# Patient Record
Sex: FEMALE
[De-identification: names, ages, dates, MRNs, and addresses within clinical notes are randomized per-mention and may not be internally consistent; named-entity substitution may affect disease eponyms.]

---

## 2017-01-11 ENCOUNTER — APPOINTMENT (OUTPATIENT)
Dept: OBGYN | Facility: CLINIC | Age: 47
End: 2017-01-11

## 2017-01-11 VITALS
DIASTOLIC BLOOD PRESSURE: 74 MMHG | WEIGHT: 144 LBS | BODY MASS INDEX: 22.6 KG/M2 | HEIGHT: 67 IN | SYSTOLIC BLOOD PRESSURE: 116 MMHG

## 2017-01-11 DIAGNOSIS — Z78.9 OTHER SPECIFIED HEALTH STATUS: ICD-10-CM

## 2017-01-11 DIAGNOSIS — B36.9 SUPERFICIAL MYCOSIS, UNSPECIFIED: ICD-10-CM

## 2017-01-11 DIAGNOSIS — R31.29 OTHER MICROSCOPIC HEMATURIA: ICD-10-CM

## 2017-01-11 DIAGNOSIS — L80 VITILIGO: ICD-10-CM

## 2017-01-11 DIAGNOSIS — Z83.3 FAMILY HISTORY OF DIABETES MELLITUS: ICD-10-CM

## 2017-01-11 DIAGNOSIS — F15.90 OTHER STIMULANT USE, UNSPECIFIED, UNCOMPLICATED: ICD-10-CM

## 2017-01-11 DIAGNOSIS — N60.19 DIFFUSE CYSTIC MASTOPATHY OF UNSPECIFIED BREAST: ICD-10-CM

## 2017-01-11 DIAGNOSIS — Z82.49 FAMILY HISTORY OF ISCHEMIC HEART DISEASE AND OTHER DISEASES OF THE CIRCULATORY SYSTEM: ICD-10-CM

## 2017-01-11 DIAGNOSIS — Z87.891 PERSONAL HISTORY OF NICOTINE DEPENDENCE: ICD-10-CM

## 2017-01-11 LAB
BILIRUB UR QL STRIP: NORMAL
COLLECTION METHOD: NORMAL
GLUCOSE UR-MCNC: NORMAL
HCG UR QL: 0.2 EU/DL
HEMOCCULT SP1 STL QL: NEGATIVE
HGB UR QL STRIP.AUTO: ABNORMAL
KETONES UR-MCNC: NORMAL
LEUKOCYTE ESTERASE UR QL STRIP: NORMAL
NITRITE UR QL STRIP: NORMAL
PH UR STRIP: 6
PROT UR STRIP-MCNC: NORMAL
SP GR UR STRIP: 1.01

## 2017-01-11 RX ORDER — UBIDECARENONE/VIT E ACET 100MG-5
CAPSULE ORAL
Refills: 0 | Status: ACTIVE | COMMUNITY

## 2017-01-11 RX ORDER — MULTIVITAMIN
TABLET ORAL
Refills: 0 | Status: ACTIVE | COMMUNITY

## 2017-01-11 RX ORDER — CALCIUM CITRATE/VITAMIN D3 315MG-6.25
TABLET ORAL
Refills: 0 | Status: ACTIVE | COMMUNITY

## 2017-01-12 ENCOUNTER — TRANSCRIPTION ENCOUNTER (OUTPATIENT)
Age: 47
End: 2017-01-12

## 2017-01-16 LAB — BACTERIA UR CULT: NORMAL

## 2017-01-18 LAB
CYTOLOGY CVX/VAG DOC THIN PREP: NORMAL
HPV HIGH+LOW RISK DNA PNL CVX: NEGATIVE

## 2017-05-30 ENCOUNTER — APPOINTMENT (OUTPATIENT)
Dept: INTERNAL MEDICINE | Facility: CLINIC | Age: 47
End: 2017-05-30

## 2017-10-24 ENCOUNTER — APPOINTMENT (OUTPATIENT)
Dept: INTERNAL MEDICINE | Facility: CLINIC | Age: 47
End: 2017-10-24
Payer: OTHER GOVERNMENT

## 2017-10-24 PROCEDURE — G0008: CPT

## 2017-10-24 PROCEDURE — 90686 IIV4 VACC NO PRSV 0.5 ML IM: CPT

## 2017-10-24 PROCEDURE — 99213 OFFICE O/P EST LOW 20 MIN: CPT | Mod: 25

## 2017-10-24 PROCEDURE — 36415 COLL VENOUS BLD VENIPUNCTURE: CPT

## 2018-05-29 ENCOUNTER — APPOINTMENT (OUTPATIENT)
Dept: OBGYN | Facility: CLINIC | Age: 48
End: 2018-05-29
Payer: OTHER GOVERNMENT

## 2018-05-29 VITALS
HEIGHT: 67 IN | WEIGHT: 149.01 LBS | DIASTOLIC BLOOD PRESSURE: 78 MMHG | BODY MASS INDEX: 23.39 KG/M2 | SYSTOLIC BLOOD PRESSURE: 120 MMHG

## 2018-05-29 DIAGNOSIS — N92.6 IRREGULAR MENSTRUATION, UNSPECIFIED: ICD-10-CM

## 2018-05-29 LAB
BILIRUB UR QL STRIP: NORMAL
COLLECTION METHOD: NORMAL
GLUCOSE UR-MCNC: NORMAL
HCG UR QL: 0.2 EU/DL
HGB UR QL STRIP.AUTO: ABNORMAL
KETONES UR-MCNC: NORMAL
LEUKOCYTE ESTERASE UR QL STRIP: NORMAL
NITRITE UR QL STRIP: NORMAL
PH UR STRIP: 6.5
PROT UR STRIP-MCNC: NORMAL
SP GR UR STRIP: 1.01

## 2018-05-29 PROCEDURE — 99396 PREV VISIT EST AGE 40-64: CPT

## 2018-05-29 PROCEDURE — 81003 URINALYSIS AUTO W/O SCOPE: CPT | Mod: QW

## 2018-10-25 ENCOUNTER — LABORATORY RESULT (OUTPATIENT)
Age: 48
End: 2018-10-25

## 2018-10-25 ENCOUNTER — MEDICATION RENEWAL (OUTPATIENT)
Age: 48
End: 2018-10-25

## 2018-10-25 ENCOUNTER — APPOINTMENT (OUTPATIENT)
Dept: INTERNAL MEDICINE | Facility: CLINIC | Age: 48
End: 2018-10-25
Payer: OTHER GOVERNMENT

## 2018-10-25 VITALS
HEIGHT: 67 IN | SYSTOLIC BLOOD PRESSURE: 120 MMHG | WEIGHT: 149 LBS | BODY MASS INDEX: 23.39 KG/M2 | DIASTOLIC BLOOD PRESSURE: 65 MMHG

## 2018-10-25 PROCEDURE — G0008: CPT

## 2018-10-25 PROCEDURE — 99214 OFFICE O/P EST MOD 30 MIN: CPT | Mod: 25

## 2018-10-25 PROCEDURE — 90686 IIV4 VACC NO PRSV 0.5 ML IM: CPT

## 2018-10-25 PROCEDURE — 36415 COLL VENOUS BLD VENIPUNCTURE: CPT

## 2018-10-25 RX ORDER — NYSTATIN AND TRIAMCINOLONE ACETONIDE 100000; 1 MG/G; MG/G
100000-0.1 CREAM TOPICAL TWICE DAILY
Qty: 1 | Refills: 3 | Status: DISCONTINUED | COMMUNITY
Start: 2017-01-11 | End: 2018-10-25

## 2018-10-25 RX ORDER — LEVOTHYROXINE SODIUM 137 UG/1
TABLET ORAL
Refills: 0 | Status: DISCONTINUED | COMMUNITY
End: 2018-10-25

## 2018-10-25 NOTE — ASSESSMENT
[FreeTextEntry1] : Physical examination is entirely within normal limits. Review of lab data from 6 months ago normal thyroid profile. Patient had elevated glucose one year ago and a repeat glucose and A1c will be reevaluated. In addition she received Fluzone vaccination after consent.\par \par Routine thyroid panel was obtained today and patient will followup in 6 months.\par \par All issues regarding patient's health and medical problems have been discussed. The patient understands and concurs with the treatment plan.

## 2018-10-25 NOTE — HISTORY OF PRESENT ILLNESS
[FreeTextEntry1] : check up and flu shot [de-identified] : Patient is here for routine followup on Hashimoto's thyroiditis and would like to get a flu vaccine. She is up-to-date with her screening, has seen a gynecologist for routine evaluation and is compliant with her medications. She has no new issues

## 2018-10-26 LAB
ALBUMIN SERPL ELPH-MCNC: 4.4 G/DL
ALP BLD-CCNC: 81 U/L
ALT SERPL-CCNC: 12 U/L
ANION GAP SERPL CALC-SCNC: 15 MMOL/L
AST SERPL-CCNC: 15 U/L
BILIRUB SERPL-MCNC: 0.2 MG/DL
BUN SERPL-MCNC: 13 MG/DL
CALCIUM SERPL-MCNC: 9.4 MG/DL
CHLORIDE SERPL-SCNC: 101 MMOL/L
CO2 SERPL-SCNC: 25 MMOL/L
CREAT SERPL-MCNC: 0.78 MG/DL
GLUCOSE SERPL-MCNC: 205 MG/DL
HBA1C MFR BLD HPLC: 5.1 %
POTASSIUM SERPL-SCNC: 4.3 MMOL/L
PROT SERPL-MCNC: 7 G/DL
SODIUM SERPL-SCNC: 141 MMOL/L
T4 SERPL-MCNC: 9.2 UG/DL
TSH SERPL-ACNC: 0.26 UIU/ML

## 2018-10-27 ENCOUNTER — MOBILE ON CALL (OUTPATIENT)
Age: 48
End: 2018-10-27

## 2018-10-29 LAB
ANION GAP SERPL CALC-SCNC: 18 MMOL/L
BUN SERPL-MCNC: 14 MG/DL
CALCIUM SERPL-MCNC: 9.5 MG/DL
CHLORIDE SERPL-SCNC: 101 MMOL/L
CO2 SERPL-SCNC: 22 MMOL/L
CREAT SERPL-MCNC: 0.77 MG/DL
FRUCTOSAMINE SERPL-MCNC: 246 UMOL/L
GLUCOSE SERPL-MCNC: 204 MG/DL
POTASSIUM SERPL-SCNC: 4.3 MMOL/L
SODIUM SERPL-SCNC: 141 MMOL/L

## 2018-10-30 ENCOUNTER — TRANSCRIPTION ENCOUNTER (OUTPATIENT)
Age: 48
End: 2018-10-30

## 2018-10-30 LAB
HGB A MFR BLD: 95.9 %
HGB A2 MFR BLD: 2.7 %
HGB F MFR BLD: 1.4 %
HGB FRACT BLD-IMP: NORMAL

## 2018-11-01 ENCOUNTER — MEDICATION RENEWAL (OUTPATIENT)
Age: 48
End: 2018-11-01

## 2018-11-06 ENCOUNTER — TRANSCRIPTION ENCOUNTER (OUTPATIENT)
Age: 48
End: 2018-11-06

## 2019-04-02 ENCOUNTER — APPOINTMENT (OUTPATIENT)
Age: 49
End: 2019-04-02
Payer: OTHER GOVERNMENT

## 2019-04-02 ENCOUNTER — TRANSCRIPTION ENCOUNTER (OUTPATIENT)
Age: 49
End: 2019-04-02

## 2019-04-02 VITALS
SYSTOLIC BLOOD PRESSURE: 101 MMHG | HEART RATE: 97 BPM | WEIGHT: 150 LBS | HEIGHT: 67 IN | DIASTOLIC BLOOD PRESSURE: 71 MMHG | BODY MASS INDEX: 23.54 KG/M2

## 2019-04-02 PROCEDURE — 99214 OFFICE O/P EST MOD 30 MIN: CPT

## 2019-04-02 NOTE — HISTORY OF PRESENT ILLNESS
[FreeTextEntry1] : elevated glucose and thyroid eval [de-identified] : Patient is here for routine followup on Hashimoto's thyroiditis She is up-to-date with her screening, has seen a gynecologist for routine evaluation and is compliant with her medications. She has no new issues\par Last visit her random glucose 2 204 with a normal hemoglobin A1c, normal hemoglobin electrophoresis and normal fructosamine level. She was not a gestational diabetic and has an appointment with endocrinology in 9 months

## 2019-04-02 NOTE — PLAN
[FreeTextEntry1] : Results of current evaluation discussed with patient. Medications were reviewed and all relevant labs as well as a 1C level and glucose levels were discussed in depth with  patient. Further options for ideal control were discussed, long-term complications of diabetes and screening for complications were also discussed. Patient was conversant and all relevant questions were asked and answered. It is unclear how to explain the elevated load glucose was normal A1c and fructosamine. She has a normal hemoglobin electrophoresis and no evidence of hemolysis. I have referred her to endocrinology and will also do a glucose tolerance test to better define the issue. This was all discussed with patient at great depth\par \par Physical examination is within normal limits and routine thyroid labs were sent.\par \par All issues regarding patient's health and medical problems have been discussed. The patient understands and concurs with the treatment plan. The whole issue of elevated glucose was discussed at great length with the patient and will endeavor to give her an answer after test

## 2019-04-15 ENCOUNTER — RX RENEWAL (OUTPATIENT)
Age: 49
End: 2019-04-15

## 2019-04-15 RX ORDER — LEVOTHYROXINE SODIUM 100 UG/1
100 TABLET ORAL DAILY
Qty: 90 | Refills: 1 | Status: ACTIVE | COMMUNITY
Start: 2018-11-01 | End: 1900-01-01

## 2019-04-25 ENCOUNTER — TRANSCRIPTION ENCOUNTER (OUTPATIENT)
Age: 49
End: 2019-04-25

## 2019-05-01 ENCOUNTER — APPOINTMENT (OUTPATIENT)
Dept: ENDOCRINOLOGY | Facility: CLINIC | Age: 49
End: 2019-05-01
Payer: OTHER GOVERNMENT

## 2019-05-01 VITALS
HEART RATE: 67 BPM | SYSTOLIC BLOOD PRESSURE: 100 MMHG | DIASTOLIC BLOOD PRESSURE: 70 MMHG | WEIGHT: 151 LBS | BODY MASS INDEX: 23.7 KG/M2 | HEIGHT: 67 IN

## 2019-05-01 PROCEDURE — 99243 OFF/OP CNSLTJ NEW/EST LOW 30: CPT

## 2019-05-30 ENCOUNTER — OTHER (OUTPATIENT)
Age: 49
End: 2019-05-30

## 2019-06-04 ENCOUNTER — CHART COPY (OUTPATIENT)
Age: 49
End: 2019-06-04

## 2019-06-04 ENCOUNTER — APPOINTMENT (OUTPATIENT)
Dept: ENDOCRINOLOGY | Facility: CLINIC | Age: 49
End: 2019-06-04
Payer: OTHER GOVERNMENT

## 2019-06-04 PROCEDURE — 95250 CONT GLUC MNTR PHYS/QHP EQP: CPT

## 2019-06-04 PROCEDURE — 99211 OFF/OP EST MAY X REQ PHY/QHP: CPT | Mod: 25

## 2019-06-14 ENCOUNTER — TRANSCRIPTION ENCOUNTER (OUTPATIENT)
Age: 49
End: 2019-06-14

## 2019-06-21 ENCOUNTER — RESULT CHARGE (OUTPATIENT)
Age: 49
End: 2019-06-21

## 2019-07-01 ENCOUNTER — TRANSCRIPTION ENCOUNTER (OUTPATIENT)
Age: 49
End: 2019-07-01

## 2019-07-09 ENCOUNTER — APPOINTMENT (OUTPATIENT)
Dept: ENDOCRINOLOGY | Facility: CLINIC | Age: 49
End: 2019-07-09

## 2019-09-03 ENCOUNTER — APPOINTMENT (OUTPATIENT)
Dept: OBGYN | Facility: CLINIC | Age: 49
End: 2019-09-03
Payer: OTHER GOVERNMENT

## 2019-09-03 VITALS
WEIGHT: 146.2 LBS | BODY MASS INDEX: 22.95 KG/M2 | SYSTOLIC BLOOD PRESSURE: 110 MMHG | DIASTOLIC BLOOD PRESSURE: 70 MMHG | HEIGHT: 67 IN

## 2019-09-03 LAB
HCG UR QL: NEGATIVE
HEMOCCULT SP1 STL QL: NEGATIVE
QUALITY CONTROL: YES

## 2019-09-03 PROCEDURE — 99396 PREV VISIT EST AGE 40-64: CPT

## 2019-09-03 PROCEDURE — 81025 URINE PREGNANCY TEST: CPT

## 2019-09-03 PROCEDURE — 82270 OCCULT BLOOD FECES: CPT

## 2019-09-03 NOTE — PHYSICAL EXAM
[Awake] : awake [Alert] : alert [No Discharge] : no discharge [No Masses] : no breast masses were palpable [Soft] : soft [Oriented x3] : oriented to person, place, and time [No Bleeding] : there was no active vaginal bleeding [Normal] : uterus [Uterine Adnexae] : were not tender and not enlarged [Nl Sphincter Tone] : normal sphincter tone [External Hemorrhoid] : an external hemorrhoid [Mass] : no breast mass [Acute Distress] : no acute distress [Axillary LAD] : no axillary lymphadenopathy [Nipple Discharge] : no nipple discharge [Occult Blood] : occult blood test from digital rectal exam was negative [Tender] : non tender [de-identified] : [facility]

## 2019-09-03 NOTE — HISTORY OF PRESENT ILLNESS
[1 Year Ago] : 1 year ago [Good] : being in good health [Healthy Diet] : a healthy diet [Regular Exercise] : regular exercise [Last Mammogram ___] : Last Mammogram was [unfilled] [Last Pap ___] : Last cervical pap smear was [unfilled] [Perimenopausal] : is perimenopausal [Contraception] : uses contraception [Withdrawal] : uses withdrawal [Definite:  ___ (Date)] : the last menstrual period was [unfilled] [Experiencing Menopausal Sxs] : experiencing menopausal symptoms [Sexually Active] : is sexually active [Monogamous] : is monogamous [Male ___] : [unfilled] male [Weight Concerns] : no concerns with her weight [Hot Flashes] : no hot flashes [Dyspareunia] : no dyspareunia [Night Sweats] : no night sweats [Normal Amount/Duration] : was abnormal [Spotting Between  Menses] : no spotting between menses [Regular Cycle Intervals] : periods have been irregular [Menstrual Cramps] : no menstrual cramps [Currently In Menopause] : not currently in menopause

## 2019-09-09 LAB
CYTOLOGY CVX/VAG DOC THIN PREP: NORMAL
HPV HIGH+LOW RISK DNA PNL CVX: NOT DETECTED

## 2019-10-21 ENCOUNTER — RX RENEWAL (OUTPATIENT)
Age: 49
End: 2019-10-21

## 2019-10-21 ENCOUNTER — MEDICATION RENEWAL (OUTPATIENT)
Age: 49
End: 2019-10-21

## 2019-10-21 PROBLEM — R73.09 ELEVATED GLUCOSE LEVEL: Status: ACTIVE | Noted: 2018-10-26

## 2019-10-22 ENCOUNTER — APPOINTMENT (OUTPATIENT)
Dept: INTERNAL MEDICINE | Facility: CLINIC | Age: 49
End: 2019-10-22
Payer: OTHER GOVERNMENT

## 2019-10-22 VITALS
HEIGHT: 67 IN | SYSTOLIC BLOOD PRESSURE: 130 MMHG | BODY MASS INDEX: 22.6 KG/M2 | WEIGHT: 144 LBS | DIASTOLIC BLOOD PRESSURE: 70 MMHG

## 2019-10-22 DIAGNOSIS — Z23 ENCOUNTER FOR IMMUNIZATION: ICD-10-CM

## 2019-10-22 DIAGNOSIS — R73.09 OTHER ABNORMAL GLUCOSE: ICD-10-CM

## 2019-10-22 PROCEDURE — G0008: CPT

## 2019-10-22 PROCEDURE — 90686 IIV4 VACC NO PRSV 0.5 ML IM: CPT

## 2019-10-22 PROCEDURE — 99214 OFFICE O/P EST MOD 30 MIN: CPT | Mod: 25

## 2019-10-22 NOTE — ASSESSMENT
[FreeTextEntry1] : Results of current evaluation discussed with patient. Medications were reviewed and all relevant labs as well as a 1C level and glucose levels were discussed in depth with  patient. Further options for ideal control were discussed, long-term complications of diabetes and screening for complications were also discussed. Patient was conversant and all relevant questions were asked and answered. Despite elevated glucose to 204 hemoglobin A1c, hemoglobin electrophoresis, fructosamine and endocrine evaluation is negative for diabetes. It is unclear the cause of random elevated glucose but the patient is aware and will be followed for onset of diabetes\par \par Repeat labs will be drawn regarding thyroid dose. Influenza vaccine given. Issues of elevated glucose discussed with the patient at length again\par All issues regarding patient's health and medical problems have been discussed. The patient understands and concurs with the treatment plan.

## 2019-10-22 NOTE — END OF VISIT
[>50% of Time Spent on Counseling for ____] : Greater than 50% of the encounter time was spent on counseling for [unfilled] [Time Spent: ___ minutes] : I have spent [unfilled] minutes of face to face time with the patient [FreeTextEntry2] : Labs drawn in office\par

## 2019-10-22 NOTE — HISTORY OF PRESENT ILLNESS
[FreeTextEntry1] : Patient here in follow up to last visit and prior issue\par  [de-identified] : Patient routine followup for hypothyroidism. She also had hyperglycemia with an extensive endocrine evaluation is negative for diabetes she is asymptomatic today

## 2019-10-22 NOTE — PHYSICAL EXAM
[No Acute Distress] : no acute distress [Well Nourished] : well nourished [Well Developed] : well developed [Well-Appearing] : well-appearing [Normal Sclera/Conjunctiva] : normal sclera/conjunctiva [PERRL] : pupils equal round and reactive to light [Normal Outer Ear/Nose] : the outer ears and nose were normal in appearance [EOMI] : extraocular movements intact [Normal Oropharynx] : the oropharynx was normal [No JVD] : no jugular venous distention [No Lymphadenopathy] : no lymphadenopathy [No Respiratory Distress] : no respiratory distress  [Supple] : supple [Thyroid Normal, No Nodules] : the thyroid was normal and there were no nodules present [No Accessory Muscle Use] : no accessory muscle use [Clear to Auscultation] : lungs were clear to auscultation bilaterally [Normal Rate] : normal rate  [Regular Rhythm] : with a regular rhythm [Normal S1, S2] : normal S1 and S2 [No Carotid Bruits] : no carotid bruits [No Murmur] : no murmur heard [No Abdominal Bruit] : a ~M bruit was not heard ~T in the abdomen [No Varicosities] : no varicosities [Pedal Pulses Present] : the pedal pulses are present [No Palpable Aorta] : no palpable aorta [No Edema] : there was no peripheral edema [No Extremity Clubbing/Cyanosis] : no extremity clubbing/cyanosis [Soft] : abdomen soft [Non Tender] : non-tender [Non-distended] : non-distended [No Masses] : no abdominal mass palpated [Normal Bowel Sounds] : normal bowel sounds [No HSM] : no HSM [No CVA Tenderness] : no CVA  tenderness [Normal Posterior Cervical Nodes] : no posterior cervical lymphadenopathy [Normal Anterior Cervical Nodes] : no anterior cervical lymphadenopathy [No Spinal Tenderness] : no spinal tenderness [No Joint Swelling] : no joint swelling [Grossly Normal Strength/Tone] : grossly normal strength/tone [No Rash] : no rash [No Focal Deficits] : no focal deficits [Coordination Grossly Intact] : coordination grossly intact [Deep Tendon Reflexes (DTR)] : deep tendon reflexes were 2+ and symmetric [Normal Gait] : normal gait [Normal Affect] : the affect was normal [Normal Insight/Judgement] : insight and judgment were intact

## 2019-10-23 ENCOUNTER — TRANSCRIPTION ENCOUNTER (OUTPATIENT)
Age: 49
End: 2019-10-23

## 2019-10-23 LAB
ANION GAP SERPL CALC-SCNC: 14 MMOL/L
BUN SERPL-MCNC: 11 MG/DL
CALCIUM SERPL-MCNC: 9.4 MG/DL
CHLORIDE SERPL-SCNC: 102 MMOL/L
CO2 SERPL-SCNC: 24 MMOL/L
CREAT SERPL-MCNC: 0.74 MG/DL
ESTIMATED AVERAGE GLUCOSE: 100 MG/DL
GLUCOSE SERPL-MCNC: 91 MG/DL
HBA1C MFR BLD HPLC: 5.1 %
POTASSIUM SERPL-SCNC: 4.6 MMOL/L
SODIUM SERPL-SCNC: 140 MMOL/L
TSH SERPL-ACNC: 1.83 UIU/ML

## 2020-04-16 ENCOUNTER — RX RENEWAL (OUTPATIENT)
Age: 50
End: 2020-04-16

## 2020-10-05 ENCOUNTER — RX RENEWAL (OUTPATIENT)
Age: 50
End: 2020-10-05

## 2021-08-19 ENCOUNTER — APPOINTMENT (OUTPATIENT)
Dept: INTERNAL MEDICINE | Facility: CLINIC | Age: 51
End: 2021-08-19
Payer: OTHER GOVERNMENT

## 2021-08-19 VITALS
WEIGHT: 125 LBS | SYSTOLIC BLOOD PRESSURE: 130 MMHG | BODY MASS INDEX: 19.62 KG/M2 | HEIGHT: 67 IN | DIASTOLIC BLOOD PRESSURE: 65 MMHG | TEMPERATURE: 97.2 F

## 2021-08-19 DIAGNOSIS — S90.521A BLISTER (NONTHERMAL), RIGHT ANKLE, INITIAL ENCOUNTER: ICD-10-CM

## 2021-08-19 PROCEDURE — 99213 OFFICE O/P EST LOW 20 MIN: CPT

## 2021-08-19 PROCEDURE — 99072 ADDL SUPL MATRL&STAF TM PHE: CPT

## 2021-08-19 RX ORDER — SILVER SULFADIAZINE 10 MG/G
1 CREAM TOPICAL TWICE DAILY
Qty: 1 | Refills: 1 | Status: ACTIVE | COMMUNITY
Start: 2021-08-19 | End: 1900-01-01

## 2021-08-19 NOTE — HISTORY OF PRESENT ILLNESS
[FreeTextEntry1] : Blister right heel [de-identified] : Patient here with rather acute onset of increasing blister right heel.  She is unaware of any bite or pressure to that area.  She denies any systemic symptoms

## 2021-08-19 NOTE — ASSESSMENT
[FreeTextEntry1] : Patient with nonspecific blister of right heel.  There is no evidence of infection.  Area will be treated with topical silver dine twice a day until it heals.  There was an erythematous area at the bottom that did not appear infected.  Patient told if this does not improve she should seek dermatologic evaluation.  This appears to be a friction blister as discussed with her

## 2021-08-19 NOTE — PHYSICAL EXAM
[Normal] : no acute distress, well nourished, well developed and well-appearing [de-identified] : Patient with approximate 2 cm blister posterior heel.  There is no associated cellulitis.  Area was tender and therefore aseptically opened with sterile scalpel after cleansing of the skin.  Clear fluid removed.  No underlying cellulitis or lesions

## 2021-11-04 ENCOUNTER — NON-APPOINTMENT (OUTPATIENT)
Age: 51
End: 2021-11-04

## 2021-11-04 ENCOUNTER — APPOINTMENT (OUTPATIENT)
Dept: OBGYN | Facility: CLINIC | Age: 51
End: 2021-11-04
Payer: OTHER GOVERNMENT

## 2021-11-04 VITALS
SYSTOLIC BLOOD PRESSURE: 118 MMHG | BODY MASS INDEX: 18.83 KG/M2 | WEIGHT: 120 LBS | HEIGHT: 67 IN | DIASTOLIC BLOOD PRESSURE: 72 MMHG

## 2021-11-04 LAB
HCG UR QL: NEGATIVE
QUALITY CONTROL: YES

## 2021-11-04 PROCEDURE — 99396 PREV VISIT EST AGE 40-64: CPT

## 2021-11-04 PROCEDURE — 99072 ADDL SUPL MATRL&STAF TM PHE: CPT

## 2021-11-04 PROCEDURE — 81025 URINE PREGNANCY TEST: CPT

## 2021-11-04 NOTE — HISTORY OF PRESENT ILLNESS
[FreeTextEntry1] : HOT FLASHES WHEN MENSES ARE SKIPPED, HAS BEEN IRREGULAR Q 3-4 MONTHS. FLOW IS LIGHTER.  NO SLEEP OR MOOD COMPLAINTS.  WORKING AT [employer], WALKING 1 HOUR DAILY.\par \par VERY STRESSFUL YEAR.  SON IN COLLEGE LOCALLY, DAUGHTER MOVED TO [facility] APARTMENT.  \par \par GETTING   FROM , IS NOW , HE HAD ONLINE AFFAIR.  DOES NOT THINK HE WAS S/A, DECLINES STD TESTING.\par \par DISCUSSED PRECAUTIONS AGAINST COVID19, INCLUDING MASK WEARING, SOCIAL DISTANCING AND HAND WASHING.\par VACCINATED X 2. [Patient reported mammogram was normal] : Patient reported mammogram was normal [Patient reported breast sonogram was normal] : Patient reported breast sonogram was normal [Patient reported PAP Smear was normal] : Patient reported PAP Smear was normal [Mammogramdate] : 10/2021 [TextBox_19] : HETEROGENEOUSLY DENSE BREASTS [BreastSonogramDate] : 10/2021 [PapSmeardate] : 2019 [No] : Patient does not have concerns regarding sex [Previously active] : previously active [FreeTextEntry2] :  FROM , NOT DATING

## 2021-11-04 NOTE — PHYSICAL EXAM
[Appropriately responsive] : appropriately responsive [Alert] : alert [No Acute Distress] : no acute distress [No Murmurs] : no murmurs [Clear to Auscultation B/L] : clear to auscultation bilaterally [Soft] : soft [Non-tender] : non-tender [Non-distended] : non-distended [No HSM] : No HSM [No Lesions] : no lesions [No Mass] : no mass [Oriented x3] : oriented x3 [Examination Of The Breasts] : a normal appearance [No Masses] : no breast masses were palpable [Labia Majora] : normal [Labia Minora] : normal [Normal] : normal [Uterine Adnexae] : normal [No Tenderness] : no tenderness [Nl Sphincter Tone] : normal sphincter tone [FreeTextEntry9] : GUAIAC NEGATIVE

## 2021-12-02 ENCOUNTER — TRANSCRIPTION ENCOUNTER (OUTPATIENT)
Age: 51
End: 2021-12-02

## 2022-01-04 ENCOUNTER — RX RENEWAL (OUTPATIENT)
Age: 52
End: 2022-01-04

## 2022-06-29 ENCOUNTER — RX RENEWAL (OUTPATIENT)
Age: 52
End: 2022-06-29

## 2022-12-26 ENCOUNTER — RX RENEWAL (OUTPATIENT)
Age: 52
End: 2022-12-26

## 2023-01-25 ENCOUNTER — TRANSCRIPTION ENCOUNTER (OUTPATIENT)
Age: 53
End: 2023-01-25

## 2023-01-30 ENCOUNTER — RX CHANGE (OUTPATIENT)
Age: 53
End: 2023-01-30

## 2023-01-30 RX ORDER — LEVOTHYROXINE SODIUM 0.1 MG/1
100 TABLET ORAL
Qty: 30 | Refills: 0 | Status: DISCONTINUED | COMMUNITY
Start: 2019-10-21 | End: 2023-01-30

## 2023-02-15 ENCOUNTER — NON-APPOINTMENT (OUTPATIENT)
Age: 53
End: 2023-02-15

## 2023-02-15 ENCOUNTER — APPOINTMENT (OUTPATIENT)
Dept: INTERNAL MEDICINE | Facility: CLINIC | Age: 53
End: 2023-02-15
Payer: OTHER GOVERNMENT

## 2023-02-15 VITALS
WEIGHT: 126 LBS | BODY MASS INDEX: 19.78 KG/M2 | HEIGHT: 67 IN | SYSTOLIC BLOOD PRESSURE: 110 MMHG | DIASTOLIC BLOOD PRESSURE: 60 MMHG

## 2023-02-15 DIAGNOSIS — Z13.1 ENCOUNTER FOR SCREENING FOR DIABETES MELLITUS: ICD-10-CM

## 2023-02-15 DIAGNOSIS — Z00.00 ENCOUNTER FOR GENERAL ADULT MEDICAL EXAMINATION W/OUT ABNORMAL FINDINGS: ICD-10-CM

## 2023-02-15 PROCEDURE — G0444 DEPRESSION SCREEN ANNUAL: CPT | Mod: 59

## 2023-02-15 PROCEDURE — 99214 OFFICE O/P EST MOD 30 MIN: CPT | Mod: 25

## 2023-02-15 PROCEDURE — 36415 COLL VENOUS BLD VENIPUNCTURE: CPT

## 2023-02-15 PROCEDURE — 99396 PREV VISIT EST AGE 40-64: CPT | Mod: 25

## 2023-02-15 PROCEDURE — 99072 ADDL SUPL MATRL&STAF TM PHE: CPT

## 2023-02-15 NOTE — HEALTH RISK ASSESSMENT
[No] : No [Patient reported mammogram was normal] : Patient reported mammogram was normal [Patient declined colonoscopy] : Patient declined colonoscopy [Never] : Never [[id number]] : 0 [MammogramDate] : 10/28/2021 [ColonoscopyDate] : none [ColonoscopyComments] : refused to date rt gi and cologuard

## 2023-02-15 NOTE — PHYSICAL EXAM
[No Acute Distress] : no acute distress [Well Nourished] : well nourished [Well Developed] : well developed [Well-Appearing] : well-appearing [Normal Sclera/Conjunctiva] : normal sclera/conjunctiva [PERRL] : pupils equal round and reactive to light [EOMI] : extraocular movements intact [Normal Outer Ear/Nose] : the outer ears and nose were normal in appearance [Normal Oropharynx] : the oropharynx was normal [No JVD] : no jugular venous distention [No Lymphadenopathy] : no lymphadenopathy [Supple] : supple [Thyroid Normal, No Nodules] : the thyroid was normal and there were no nodules present [No Respiratory Distress] : no respiratory distress  [No Accessory Muscle Use] : no accessory muscle use [Clear to Auscultation] : lungs were clear to auscultation bilaterally [Normal Rate] : normal rate  [Regular Rhythm] : with a regular rhythm [Normal S1, S2] : normal S1 and S2 [No Murmur] : no murmur heard [No Carotid Bruits] : no carotid bruits [No Abdominal Bruit] : a ~M bruit was not heard ~T in the abdomen [No Varicosities] : no varicosities [Pedal Pulses Present] : the pedal pulses are present [No Edema] : there was no peripheral edema [No Palpable Aorta] : no palpable aorta [No Extremity Clubbing/Cyanosis] : no extremity clubbing/cyanosis [Soft] : abdomen soft [Non Tender] : non-tender [Non-distended] : non-distended [No Masses] : no abdominal mass palpated [No HSM] : no HSM [Normal Bowel Sounds] : normal bowel sounds [Normal Posterior Cervical Nodes] : no posterior cervical lymphadenopathy [Normal Anterior Cervical Nodes] : no anterior cervical lymphadenopathy [No CVA Tenderness] : no CVA  tenderness [No Spinal Tenderness] : no spinal tenderness [No Joint Swelling] : no joint swelling [Grossly Normal Strength/Tone] : grossly normal strength/tone [No Rash] : no rash [Coordination Grossly Intact] : coordination grossly intact [No Focal Deficits] : no focal deficits [Normal Gait] : normal gait [Deep Tendon Reflexes (DTR)] : deep tendon reflexes were 2+ and symmetric [Normal Affect] : the affect was normal [Normal Insight/Judgement] : insight and judgment were intact [de-identified] : Patient with patient with bilateral bluish hands without cyanosis.  Definite loss of pulse right wrist with extension and moving arm posteriorly.  Loss of brachial pulse to a lesser extent. [de-identified] : Normal range of motion of neck

## 2023-02-15 NOTE — HEALTH RISK ASSESSMENT
[No] : No [Patient reported mammogram was normal] : Patient reported mammogram was normal [Patient declined colonoscopy] : Patient declined colonoscopy [Never] : Never [[id number]] : 0 [MammogramDate] : 10/28/2021 [ColonoscopyDate] : none [ColonoscopyComments] : refused to date rt gi and cologuard

## 2023-02-15 NOTE — REVIEW OF SYSTEMS
[Negative] : Heme/Lymph [FreeTextEntry2] : refuses colonoscopy  [FreeTextEntry9] : No complaints of neck pain [de-identified] : Paresthesias down the right arm

## 2023-02-15 NOTE — PHYSICAL EXAM
[No Acute Distress] : no acute distress [Well Nourished] : well nourished [Well Developed] : well developed [Well-Appearing] : well-appearing [Normal Sclera/Conjunctiva] : normal sclera/conjunctiva [PERRL] : pupils equal round and reactive to light [EOMI] : extraocular movements intact [Normal Outer Ear/Nose] : the outer ears and nose were normal in appearance [Normal Oropharynx] : the oropharynx was normal [No JVD] : no jugular venous distention [No Lymphadenopathy] : no lymphadenopathy [Supple] : supple [Thyroid Normal, No Nodules] : the thyroid was normal and there were no nodules present [No Respiratory Distress] : no respiratory distress  [No Accessory Muscle Use] : no accessory muscle use [Clear to Auscultation] : lungs were clear to auscultation bilaterally [Normal Rate] : normal rate  [Regular Rhythm] : with a regular rhythm [Normal S1, S2] : normal S1 and S2 [No Murmur] : no murmur heard [No Carotid Bruits] : no carotid bruits [No Abdominal Bruit] : a ~M bruit was not heard ~T in the abdomen [No Varicosities] : no varicosities [Pedal Pulses Present] : the pedal pulses are present [No Edema] : there was no peripheral edema [No Palpable Aorta] : no palpable aorta [No Extremity Clubbing/Cyanosis] : no extremity clubbing/cyanosis [Soft] : abdomen soft [Non Tender] : non-tender [Non-distended] : non-distended [No Masses] : no abdominal mass palpated [No HSM] : no HSM [Normal Bowel Sounds] : normal bowel sounds [Normal Posterior Cervical Nodes] : no posterior cervical lymphadenopathy [Normal Anterior Cervical Nodes] : no anterior cervical lymphadenopathy [No CVA Tenderness] : no CVA  tenderness [No Spinal Tenderness] : no spinal tenderness [No Joint Swelling] : no joint swelling [Grossly Normal Strength/Tone] : grossly normal strength/tone [No Rash] : no rash [Coordination Grossly Intact] : coordination grossly intact [No Focal Deficits] : no focal deficits [Normal Gait] : normal gait [Deep Tendon Reflexes (DTR)] : deep tendon reflexes were 2+ and symmetric [Normal Affect] : the affect was normal [Normal Insight/Judgement] : insight and judgment were intact [de-identified] : Patient with patient with bilateral bluish hands without cyanosis.  Definite loss of pulse right wrist with extension and moving arm posteriorly.  Loss of brachial pulse to a lesser extent. [de-identified] : Normal range of motion of neck

## 2023-02-15 NOTE — REVIEW OF SYSTEMS
[Negative] : Heme/Lymph [FreeTextEntry2] : refuses colonoscopy  [FreeTextEntry9] : No complaints of neck pain [de-identified] : Paresthesias down the right arm

## 2023-02-15 NOTE — HISTORY OF PRESENT ILLNESS
[FreeTextEntry1] : Patient here for evaluation of multiple medical problems and new issues to be discussed\par wellness [de-identified] : first visit since 08/21\par 52 WF\par wellness\par Since last visit patient has been under significant anxiety she is going through with divorce.  She has not followed up.  She comes in now not depressed but on the dress.  She is compliant with her thyroid medication.  She has not had a colonoscopy and refuses now due to her other issues but consents to Cologuard.  Patient has not received shingles or tetanus in both suggested\par That her only major new complaint is periodic paresthesias with numbness down her right arm usually when she is on the computer.  She has no neck pain and no issues in other parts of her body with paresthesias\par

## 2023-02-15 NOTE — ASSESSMENT
[FreeTextEntry1] : Wellness exam completed. All issues of health and screening up to date. Immunizations and screening reviewed with patient. All issues of health for the current year discussed in depth with patient. Patient was conversant during the exam and all pertinent issues addressed. Depression screen 0 despite the divorce more anxious in chart. Fall prevention was addressed.\par Immunizations not up-to-date and Shingrix and Tdap strongly recommended and patient will pursue drugstore patient is yet to have colonoscopy and refusing one for now due to ongoing issues.  She is refused in the past also.  Cologuard will be arranged\par Patient referred to vascular surgery for possible positional right thoracic outlet syndrome.  X-rays of neck for both cervical spine disease and cervical rib ordered.\par All this discussed with patient at great length.  Labs drawn.  Patient to follow-up in 1 month.  Insert care summary\par

## 2023-02-15 NOTE — HISTORY OF PRESENT ILLNESS
[FreeTextEntry1] : Patient here for evaluation of multiple medical problems and new issues to be discussed\par wellness [de-identified] : first visit since 08/21\par 52 WF\par wellness\par Since last visit patient has been under significant anxiety she is going through with divorce.  She has not followed up.  She comes in now not depressed but on the dress.  She is compliant with her thyroid medication.  She has not had a colonoscopy and refuses now due to her other issues but consents to Cologuard.  Patient has not received shingles or tetanus in both suggested\par That her only major new complaint is periodic paresthesias with numbness down her right arm usually when she is on the computer.  She has no neck pain and no issues in other parts of her body with paresthesias\par

## 2023-02-21 ENCOUNTER — TRANSCRIPTION ENCOUNTER (OUTPATIENT)
Age: 53
End: 2023-02-21

## 2023-02-28 ENCOUNTER — TRANSCRIPTION ENCOUNTER (OUTPATIENT)
Age: 53
End: 2023-02-28

## 2023-02-28 ENCOUNTER — OUTPATIENT (OUTPATIENT)
Dept: OUTPATIENT SERVICES | Facility: HOSPITAL | Age: 53
LOS: 1 days | End: 2023-02-28
Payer: COMMERCIAL

## 2023-02-28 ENCOUNTER — APPOINTMENT (OUTPATIENT)
Dept: VASCULAR SURGERY | Facility: CLINIC | Age: 53
End: 2023-02-28
Payer: OTHER GOVERNMENT

## 2023-02-28 VITALS
OXYGEN SATURATION: 96 % | TEMPERATURE: 96.8 F | DIASTOLIC BLOOD PRESSURE: 75 MMHG | HEART RATE: 59 BPM | HEIGHT: 67 IN | WEIGHT: 129 LBS | BODY MASS INDEX: 20.25 KG/M2 | RESPIRATION RATE: 16 BRPM | SYSTOLIC BLOOD PRESSURE: 110 MMHG

## 2023-02-28 DIAGNOSIS — G54.0 BRACHIAL PLEXUS DISORDERS: ICD-10-CM

## 2023-02-28 DIAGNOSIS — R20.2 PARESTHESIA OF SKIN: ICD-10-CM

## 2023-02-28 PROCEDURE — 99203 OFFICE O/P NEW LOW 30 MIN: CPT

## 2023-02-28 PROCEDURE — 99072 ADDL SUPL MATRL&STAF TM PHE: CPT

## 2023-02-28 PROCEDURE — 72050 X-RAY EXAM NECK SPINE 4/5VWS: CPT | Mod: 26

## 2023-03-14 ENCOUNTER — APPOINTMENT (OUTPATIENT)
Dept: INTERNAL MEDICINE | Facility: CLINIC | Age: 53
End: 2023-03-14
Payer: OTHER GOVERNMENT

## 2023-03-14 VITALS
DIASTOLIC BLOOD PRESSURE: 60 MMHG | BODY MASS INDEX: 20.25 KG/M2 | WEIGHT: 129 LBS | HEIGHT: 67 IN | SYSTOLIC BLOOD PRESSURE: 110 MMHG

## 2023-03-14 PROCEDURE — 99072 ADDL SUPL MATRL&STAF TM PHE: CPT

## 2023-03-14 PROCEDURE — 99213 OFFICE O/P EST LOW 20 MIN: CPT

## 2023-03-14 NOTE — ASSESSMENT
[FreeTextEntry1] : Patient with paresthesias right arm associated with abnormalities C5-C6 which is the appropriate innervation.  We had an MRI performed and patient will be referred to neurosurgeon for evaluation.  Discussed physical labs with patient which are all within normal limits all issues regarding patient's health and medical problems have been discussed. The patient understands and concurs with the treatment plan.

## 2023-03-14 NOTE — HISTORY OF PRESENT ILLNESS
[FreeTextEntry1] : vascular follow up\par \par  [de-identified] : \par [id number]\par Patient here in follow-up after last visit when she had numbness down her right arm.  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  X-ray of her neck showed C4-C5 minimal problems but corresponds to area causing numbness.  MRI set up.  Patient without symptoms other than what she had last visit

## 2023-03-14 NOTE — REVIEW OF SYSTEMS
[Negative] : Heme/Lymph [FreeTextEntry2] : refuses colonoscopy  [FreeTextEntry9] : No complaints of neck pain [de-identified] : Paresthesias down the right arm

## 2023-03-14 NOTE — PHYSICAL EXAM
[No Acute Distress] : no acute distress [Well Nourished] : well nourished [Well Developed] : well developed [Well-Appearing] : well-appearing [Normal Sclera/Conjunctiva] : normal sclera/conjunctiva [PERRL] : pupils equal round and reactive to light [EOMI] : extraocular movements intact [Normal Outer Ear/Nose] : the outer ears and nose were normal in appearance [Normal Oropharynx] : the oropharynx was normal [No JVD] : no jugular venous distention [No Lymphadenopathy] : no lymphadenopathy [Supple] : supple [Thyroid Normal, No Nodules] : the thyroid was normal and there were no nodules present [No Respiratory Distress] : no respiratory distress  [No Accessory Muscle Use] : no accessory muscle use [Clear to Auscultation] : lungs were clear to auscultation bilaterally [Normal Rate] : normal rate  [Regular Rhythm] : with a regular rhythm [Normal S1, S2] : normal S1 and S2 [No Murmur] : no murmur heard [No Carotid Bruits] : no carotid bruits [No Abdominal Bruit] : a ~M bruit was not heard ~T in the abdomen [No Varicosities] : no varicosities [Pedal Pulses Present] : the pedal pulses are present [No Edema] : there was no peripheral edema [No Palpable Aorta] : no palpable aorta [No Extremity Clubbing/Cyanosis] : no extremity clubbing/cyanosis [Soft] : abdomen soft [Non Tender] : non-tender [Non-distended] : non-distended [No Masses] : no abdominal mass palpated [No HSM] : no HSM [Normal Bowel Sounds] : normal bowel sounds [Normal Posterior Cervical Nodes] : no posterior cervical lymphadenopathy [Normal Anterior Cervical Nodes] : no anterior cervical lymphadenopathy [No CVA Tenderness] : no CVA  tenderness [No Spinal Tenderness] : no spinal tenderness [No Joint Swelling] : no joint swelling [Grossly Normal Strength/Tone] : grossly normal strength/tone [No Rash] : no rash [Coordination Grossly Intact] : coordination grossly intact [No Focal Deficits] : no focal deficits [Normal Gait] : normal gait [Deep Tendon Reflexes (DTR)] : deep tendon reflexes were 2+ and symmetric [Normal Affect] : the affect was normal [Normal Insight/Judgement] : insight and judgment were intact [de-identified] : Again patient loses radial pulses with active rotation of the neck opposite to lifting up her right arm above blood pressure taken was 100/60 in both positions

## 2023-03-15 PROBLEM — G54.0 THORACIC OUTLET SYNDROME OF RIGHT THORACIC OUTLET: Status: ACTIVE | Noted: 2023-02-15

## 2023-03-15 PROBLEM — R20.2 PARESTHESIAS: Status: ACTIVE | Noted: 2023-03-14

## 2023-03-15 NOTE — HISTORY OF PRESENT ILLNESS
[FreeTextEntry1] : 51yo female PMH hypothyrdoidism here for intermittent right hand tinling and numbness.She states much of the time she does not have normal sensation in her thumb and digit 2-3. It's worse when using the computer but can not be provoked with position. She denies previous history of radiculopathy, neck injury, chiropractor use. She denies any similar symptoms in the left arm. Denies pain with movement, denies wounds, denies swelling in the arm.\par \par PMH: hypothyroidism\par PSH: none\par Meds: levothyroxine

## 2023-03-15 NOTE — ASSESSMENT
[FreeTextEntry1] : 51yo female with several months of right upper extremity parasthesia in the radial nerve (inner arm, digit 1-3) distribution\par -Possible neurogenic thoracic outlet syndrome; I feel Ms. Tapia likely does not have TOS given nature and distribution of symptoms; most commonly TOS in the ulnar (C8-T1) distribution; furthermore unable to provoke symptoms with exam; though this diagnosis is unlikely I feel it is important to obtain further testing to 1. rule it out and 2. potentially find source of her symptoms\par \par -would recommend MRI with provocative maneuvers; right arm extension\par -will follow up in 2 months after MRI\par \par should any other symptoms come up Ms. Tapia should call my office for sooner visit

## 2023-03-15 NOTE — PHYSICAL EXAM
[JVD] : no jugular venous distention  [Normal Breath Sounds] : Normal breath sounds [Normal Rate and Rhythm] : normal rate and rhythm [2+] : left 2+ [de-identified] : well appearing [de-identified] : normal ROM [FreeTextEntry1] : Right Upper extremity: +2 brachial, +2 radial and +2 ulnar pulses; 5/5 strength in all muscle groups; digits warm and well perfursed; no edema\par \par Negative EAST test \par Negative tinel's sign

## 2023-06-08 ENCOUNTER — APPOINTMENT (OUTPATIENT)
Dept: MRI IMAGING | Facility: CLINIC | Age: 53
End: 2023-06-08

## 2023-06-16 ENCOUNTER — OUTPATIENT (OUTPATIENT)
Dept: OUTPATIENT SERVICES | Facility: HOSPITAL | Age: 53
LOS: 1 days | End: 2023-06-16
Payer: COMMERCIAL

## 2023-06-16 ENCOUNTER — APPOINTMENT (OUTPATIENT)
Dept: MRI IMAGING | Facility: CLINIC | Age: 53
End: 2023-06-16
Payer: OTHER GOVERNMENT

## 2023-06-16 DIAGNOSIS — G54.0 BRACHIAL PLEXUS DISORDERS: ICD-10-CM

## 2023-06-16 PROCEDURE — 70548 MR ANGIOGRAPHY NECK W/DYE: CPT | Mod: 26

## 2023-06-16 PROCEDURE — A9585: CPT

## 2023-06-16 PROCEDURE — 70548 MR ANGIOGRAPHY NECK W/DYE: CPT

## 2023-07-23 ENCOUNTER — RX RENEWAL (OUTPATIENT)
Age: 53
End: 2023-07-23

## 2023-07-23 RX ORDER — LEVOTHYROXINE SODIUM 0.1 MG/1
100 TABLET ORAL
Qty: 90 | Refills: 3 | Status: ACTIVE | COMMUNITY
Start: 2023-01-30 | End: 1900-01-01

## 2023-09-14 ENCOUNTER — NON-APPOINTMENT (OUTPATIENT)
Age: 53
End: 2023-09-14

## 2023-10-30 ENCOUNTER — APPOINTMENT (OUTPATIENT)
Dept: OBGYN | Facility: CLINIC | Age: 53
End: 2023-10-30
Payer: OTHER GOVERNMENT

## 2023-10-30 VITALS
DIASTOLIC BLOOD PRESSURE: 70 MMHG | SYSTOLIC BLOOD PRESSURE: 120 MMHG | WEIGHT: 128 LBS | BODY MASS INDEX: 20.09 KG/M2 | HEIGHT: 67 IN

## 2023-10-30 DIAGNOSIS — R92.30 INCONCLUSIVE MAMMOGRAM: ICD-10-CM

## 2023-10-30 DIAGNOSIS — Z12.39 ENCOUNTER FOR OTHER SCREENING FOR MALIGNANT NEOPLASM OF BREAST: ICD-10-CM

## 2023-10-30 DIAGNOSIS — Z01.419 ENCOUNTER FOR GYNECOLOGICAL EXAMINATION (GENERAL) (ROUTINE) W/OUT ABNORMAL FINDINGS: ICD-10-CM

## 2023-10-30 DIAGNOSIS — R92.2 INCONCLUSIVE MAMMOGRAM: ICD-10-CM

## 2023-10-30 PROCEDURE — 99396 PREV VISIT EST AGE 40-64: CPT

## 2023-10-31 LAB — HPV HIGH+LOW RISK DNA PNL CVX: NOT DETECTED

## 2023-11-06 LAB — CYTOLOGY CVX/VAG DOC THIN PREP: NORMAL

## 2024-05-05 ENCOUNTER — NON-APPOINTMENT (OUTPATIENT)
Age: 54
End: 2024-05-05

## 2024-05-06 ENCOUNTER — TRANSCRIPTION ENCOUNTER (OUTPATIENT)
Age: 54
End: 2024-05-06

## 2024-05-07 ENCOUNTER — TRANSCRIPTION ENCOUNTER (OUTPATIENT)
Age: 54
End: 2024-05-07

## 2024-05-13 ENCOUNTER — TRANSCRIPTION ENCOUNTER (OUTPATIENT)
Age: 54
End: 2024-05-13

## 2024-05-14 ENCOUNTER — TRANSCRIPTION ENCOUNTER (OUTPATIENT)
Age: 54
End: 2024-05-14

## 2024-06-12 ENCOUNTER — TRANSCRIPTION ENCOUNTER (OUTPATIENT)
Age: 54
End: 2024-06-12

## 2024-06-17 PROBLEM — E03.9 HYPOTHYROIDISM: Status: ACTIVE | Noted: 2017-01-11

## 2024-06-17 PROBLEM — Z13.89 SCREENING FOR BLOOD OR PROTEIN IN URINE: Status: ACTIVE | Noted: 2023-02-15

## 2024-06-17 PROBLEM — R79.9 ABNORMAL FINDING OF BLOOD CHEMISTRY: Status: ACTIVE | Noted: 2023-02-15

## 2024-06-17 PROBLEM — Z00.01 ANNUAL VISIT FOR GENERAL ADULT MEDICAL EXAMINATION WITH ABNORMAL FINDINGS: Status: ACTIVE | Noted: 2023-02-15

## 2024-06-17 PROBLEM — Z13.220 SCREENING FOR CHOLESTEROL LEVEL: Status: ACTIVE | Noted: 2023-02-15

## 2024-06-17 PROBLEM — Z13.31 SCREENING FOR DEPRESSION: Status: ACTIVE | Noted: 2023-02-15

## 2024-06-18 ENCOUNTER — APPOINTMENT (OUTPATIENT)
Dept: INTERNAL MEDICINE | Facility: CLINIC | Age: 54
End: 2024-06-18
Payer: COMMERCIAL

## 2024-06-18 VITALS
BODY MASS INDEX: 20.4 KG/M2 | SYSTOLIC BLOOD PRESSURE: 90 MMHG | HEIGHT: 67 IN | DIASTOLIC BLOOD PRESSURE: 60 MMHG | WEIGHT: 130 LBS

## 2024-06-18 DIAGNOSIS — R79.9 ABNORMAL FINDING OF BLOOD CHEMISTRY, UNSPECIFIED: ICD-10-CM

## 2024-06-18 DIAGNOSIS — Z13.31 ENCOUNTER FOR SCREENING FOR DEPRESSION: ICD-10-CM

## 2024-06-18 DIAGNOSIS — Z13.89 ENCOUNTER FOR SCREENING FOR OTHER DISORDER: ICD-10-CM

## 2024-06-18 DIAGNOSIS — Z00.01 ENCOUNTER FOR GENERAL ADULT MEDICAL EXAMINATION WITH ABNORMAL FINDINGS: ICD-10-CM

## 2024-06-18 DIAGNOSIS — Z13.220 ENCOUNTER FOR SCREENING FOR LIPOID DISORDERS: ICD-10-CM

## 2024-06-18 DIAGNOSIS — Z12.11 ENCOUNTER FOR SCREENING FOR MALIGNANT NEOPLASM OF COLON: ICD-10-CM

## 2024-06-18 DIAGNOSIS — E03.9 HYPOTHYROIDISM, UNSPECIFIED: ICD-10-CM

## 2024-06-18 PROCEDURE — 99396 PREV VISIT EST AGE 40-64: CPT

## 2024-06-18 PROCEDURE — G0444 DEPRESSION SCREEN ANNUAL: CPT | Mod: 59

## 2024-06-18 NOTE — ASSESSMENT
[FreeTextEntry1] : Wellness exam completed. All issues of health and screening up to date. Immunizations and screening reviewed with patient. All issues of health for the current year discussed in depth with patient. Patient was conversant during the exam and all pertinent issues addressed.  Fall prevention was addressed.Depression screen 0 in chart. Patient hypothyroid normal labs Only abnormality is minimal elevation of liver function test that has not been seen before.  Patient denies eating excessive fats.  Will have her follow-up in 2 months will obtain repeat liver function test now with hep B and C serology as well as a.  If liver tests remain elevated the need eventually sonography and referral. Patient also without colonoscopy yet and referral sent in to have it arranged All issues regarding patient's health and medical problems have been discussed. The patient understands and concurs with the treatment plan.

## 2024-06-18 NOTE — HISTORY OF PRESENT ILLNESS
[FreeTextEntry1] : wellness [de-identified] : [id number] Patient here in follow-up after last visit when she had numbness down her right arm.  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  X-ray of her neck showed C4-C5 minimal problems but corresponds to area causing numbness.  MRI set up.  Patient without symptoms other than what she had last visit  [id number] wellness labs [facility] stressed re divorce nl labs one years ago eats well still no colon

## 2024-06-18 NOTE — HEALTH RISK ASSESSMENT
[Excellent] : ~his/her~  mood as  excellent [No falls in past year] : Patient reported no falls in the past year [0] : 2) Feeling down, depressed, or hopeless: Not at all (0) [PHQ-2 Negative - No further assessment needed] : PHQ-2 Negative - No further assessment needed [Time Spent: ___ Minutes] : I spent [unfilled] minutes performing a depression screening for this patient. [Patient reported mammogram was normal] : Patient reported mammogram was normal [[id number]] : 0 [MammogramDate] : 2023 [ColonoscopyDate] : none yet [ColonoscopyComments] : net work for consultation

## 2024-06-26 ENCOUNTER — NON-APPOINTMENT (OUTPATIENT)
Age: 54
End: 2024-06-26

## 2024-06-30 ENCOUNTER — NON-APPOINTMENT (OUTPATIENT)
Age: 54
End: 2024-06-30

## 2024-07-08 ENCOUNTER — TRANSCRIPTION ENCOUNTER (OUTPATIENT)
Age: 54
End: 2024-07-08

## 2024-07-08 ENCOUNTER — NON-APPOINTMENT (OUTPATIENT)
Age: 54
End: 2024-07-08

## 2024-07-09 ENCOUNTER — APPOINTMENT (OUTPATIENT)
Dept: INTERNAL MEDICINE | Facility: CLINIC | Age: 54
End: 2024-07-09
Payer: COMMERCIAL

## 2024-07-09 VITALS
WEIGHT: 130 LBS | SYSTOLIC BLOOD PRESSURE: 90 MMHG | BODY MASS INDEX: 20.4 KG/M2 | DIASTOLIC BLOOD PRESSURE: 70 MMHG | HEIGHT: 67 IN

## 2024-07-09 DIAGNOSIS — R17 UNSPECIFIED JAUNDICE: ICD-10-CM

## 2024-07-09 PROCEDURE — 99496 TRANSJ CARE MGMT HIGH F2F 7D: CPT

## 2024-07-09 PROCEDURE — 99215 OFFICE O/P EST HI 40 MIN: CPT

## 2024-07-15 PROBLEM — K75.9 JAUNDICE DUE TO HEPATITIS: Status: ACTIVE | Noted: 2024-07-09

## 2024-07-15 PROBLEM — B15.9 HEPATITIS A INFECTION: Status: ACTIVE | Noted: 2024-07-09

## 2024-07-16 ENCOUNTER — APPOINTMENT (OUTPATIENT)
Dept: INTERNAL MEDICINE | Facility: CLINIC | Age: 54
End: 2024-07-16
Payer: COMMERCIAL

## 2024-07-16 VITALS
WEIGHT: 130 LBS | DIASTOLIC BLOOD PRESSURE: 60 MMHG | BODY MASS INDEX: 20.4 KG/M2 | SYSTOLIC BLOOD PRESSURE: 95 MMHG | HEIGHT: 67 IN

## 2024-07-16 DIAGNOSIS — K75.9 INFLAMMATORY LIVER DISEASE, UNSPECIFIED: ICD-10-CM

## 2024-07-16 DIAGNOSIS — B15.9 HEPATITIS A W/OUT HEPATIC COMA: ICD-10-CM

## 2024-07-16 PROCEDURE — 99214 OFFICE O/P EST MOD 30 MIN: CPT

## 2024-07-18 ENCOUNTER — NON-APPOINTMENT (OUTPATIENT)
Age: 54
End: 2024-07-18

## 2024-07-19 ENCOUNTER — TRANSCRIPTION ENCOUNTER (OUTPATIENT)
Age: 54
End: 2024-07-19

## 2024-07-22 ENCOUNTER — TRANSCRIPTION ENCOUNTER (OUTPATIENT)
Age: 54
End: 2024-07-22

## 2024-07-23 ENCOUNTER — NON-APPOINTMENT (OUTPATIENT)
Age: 54
End: 2024-07-23

## 2024-07-23 ENCOUNTER — TRANSCRIPTION ENCOUNTER (OUTPATIENT)
Age: 54
End: 2024-07-23

## 2024-07-24 ENCOUNTER — TRANSCRIPTION ENCOUNTER (OUTPATIENT)
Age: 54
End: 2024-07-24

## 2024-07-24 ENCOUNTER — NON-APPOINTMENT (OUTPATIENT)
Age: 54
End: 2024-07-24

## 2024-07-29 ENCOUNTER — TRANSCRIPTION ENCOUNTER (OUTPATIENT)
Age: 54
End: 2024-07-29

## 2024-07-29 PROBLEM — U07.1 COVID-19 VIRUS INFECTION: Status: ACTIVE | Noted: 2024-07-29

## 2024-07-29 NOTE — HISTORY OF PRESENT ILLNESS
[FreeTextEntry1] : Patient here for evaluation of their medical problems and new issues to be discussed. [de-identified] : Abnl LFTS last vist 4 days later fever advil ER 07/01 GSH signif LFT abnl >2000 Prodrome of fever and fatigue - Dark Urine and jaundiced - went to hosp x 07-07/05 LFTS high -Hep A ? source  Patient still has low-grade fever.  Has not traveled food has not eaten anywhere new aware of other people with.  Her sister who is caring for received vaccines and 2 children were vaccinated by pediatrician Hospital records reviewed showed CAT scan with active gallbladder only hepatitis A IgM strongly positive liver profile peaked at over 3000 INR was elevated 1.7.  Patient is unchanged still with dark urine and light-colored stools no neurological abnormalities no confusion  [id number] Patient here in follow-up.  Recent liver function test showed significant decrease of LFTs but increase in bilirubin to 10.  She now comes in feeling better urine less discolored stool darkening low-grade fever persists otherwise asymptomatic  [id number] covid resolving hepatitis A

## 2024-07-30 ENCOUNTER — TRANSCRIPTION ENCOUNTER (OUTPATIENT)
Age: 54
End: 2024-07-30

## 2024-07-30 ENCOUNTER — APPOINTMENT (OUTPATIENT)
Dept: INTERNAL MEDICINE | Facility: CLINIC | Age: 54
End: 2024-07-30

## 2024-07-30 DIAGNOSIS — U07.1 COVID-19: ICD-10-CM

## 2024-07-30 DIAGNOSIS — R79.9 ABNORMAL FINDING OF BLOOD CHEMISTRY, UNSPECIFIED: ICD-10-CM

## 2024-08-06 ENCOUNTER — APPOINTMENT (OUTPATIENT)
Dept: INTERNAL MEDICINE | Facility: CLINIC | Age: 54
End: 2024-08-06

## 2024-08-06 PROCEDURE — 99214 OFFICE O/P EST MOD 30 MIN: CPT

## 2024-08-06 NOTE — PHYSICAL EXAM
[No Acute Distress] : no acute distress [Well Nourished] : well nourished [Well Developed] : well developed [Normal Sclera/Conjunctiva] : normal sclera/conjunctiva [Well-Appearing] : well-appearing [PERRL] : pupils equal round and reactive to light [EOMI] : extraocular movements intact [Normal Outer Ear/Nose] : the outer ears and nose were normal in appearance [Normal Oropharynx] : the oropharynx was normal [No JVD] : no jugular venous distention [No Lymphadenopathy] : no lymphadenopathy [Supple] : supple [Thyroid Normal, No Nodules] : the thyroid was normal and there were no nodules present [No Respiratory Distress] : no respiratory distress  [No Accessory Muscle Use] : no accessory muscle use [Clear to Auscultation] : lungs were clear to auscultation bilaterally [Normal Rate] : normal rate  [Regular Rhythm] : with a regular rhythm [Normal S1, S2] : normal S1 and S2 [No Murmur] : no murmur heard [No Carotid Bruits] : no carotid bruits [No Abdominal Bruit] : a ~M bruit was not heard ~T in the abdomen [No Varicosities] : no varicosities [Pedal Pulses Present] : the pedal pulses are present [No Edema] : there was no peripheral edema [No Palpable Aorta] : no palpable aorta [No Extremity Clubbing/Cyanosis] : no extremity clubbing/cyanosis [Soft] : abdomen soft [Non Tender] : non-tender [Non-distended] : non-distended [No Masses] : no abdominal mass palpated [No HSM] : no HSM [Normal Bowel Sounds] : normal bowel sounds [Normal Posterior Cervical Nodes] : no posterior cervical lymphadenopathy [Normal Anterior Cervical Nodes] : no anterior cervical lymphadenopathy [No CVA Tenderness] : no CVA  tenderness [No Spinal Tenderness] : no spinal tenderness [No Joint Swelling] : no joint swelling [Grossly Normal Strength/Tone] : grossly normal strength/tone [No Rash] : no rash [Coordination Grossly Intact] : coordination grossly intact [No Focal Deficits] : no focal deficits [Normal Gait] : normal gait [Deep Tendon Reflexes (DTR)] : deep tendon reflexes were 2+ and symmetric [Normal Affect] : the affect was normal [Normal Insight/Judgement] : insight and judgment were intact [de-identified] : Anicteric [de-identified] : No jaundice

## 2024-08-06 NOTE — HISTORY OF PRESENT ILLNESS
[FreeTextEntry1] : Patient here for evaluation of their medical problems and new issues to be discussed. [de-identified] : Abnl LFTS last vist 4 days later fever advil ER 07/01 GSH signif LFT abnl >2000 Prodrome of fever and fatigue - Dark Urine and jaundiced - went to hosp x 07-07/05 LFTS high -Hep A ? source  Patient still has low-grade fever.  Has not traveled food has not eaten anywhere new aware of other people with.  Her sister who is caring for received vaccines and 2 children were vaccinated by pediatrician Hospital records reviewed showed CAT scan with active gallbladder only hepatitis A IgM strongly positive liver profile peaked at over 3000 INR was elevated 1.7.  Patient is unchanged still with dark urine and light-colored stools no neurological abnormalities no confusion  [id number] Patient here in follow-up.  Recent liver function test showed significant decrease of LFTs but increase in bilirubin to 10.  She now comes in feeling better urine less discolored stool darkening low-grade fever persists otherwise asymptomatic  [id number] covid resolving hepatitis A  08/06/2024 Patient doing weather although still quite fatigued.  Last liver profile almost back to normal.  Subsequent COVID infection not treated with Paxlovid due to hepatotoxicity.  She is now more than 2 weeks out and is asymptomatic.  Only complaints of fatigue

## 2024-08-06 NOTE — PHYSICAL EXAM
[No Acute Distress] : no acute distress [Well Nourished] : well nourished [Well Developed] : well developed [Normal Sclera/Conjunctiva] : normal sclera/conjunctiva [Well-Appearing] : well-appearing [PERRL] : pupils equal round and reactive to light [EOMI] : extraocular movements intact [Normal Outer Ear/Nose] : the outer ears and nose were normal in appearance [Normal Oropharynx] : the oropharynx was normal [No JVD] : no jugular venous distention [No Lymphadenopathy] : no lymphadenopathy [Supple] : supple [Thyroid Normal, No Nodules] : the thyroid was normal and there were no nodules present [No Respiratory Distress] : no respiratory distress  [No Accessory Muscle Use] : no accessory muscle use [Clear to Auscultation] : lungs were clear to auscultation bilaterally [Normal Rate] : normal rate  [Regular Rhythm] : with a regular rhythm [Normal S1, S2] : normal S1 and S2 [No Carotid Bruits] : no carotid bruits [No Murmur] : no murmur heard [No Abdominal Bruit] : a ~M bruit was not heard ~T in the abdomen [No Varicosities] : no varicosities [Pedal Pulses Present] : the pedal pulses are present [No Edema] : there was no peripheral edema [No Palpable Aorta] : no palpable aorta [No Extremity Clubbing/Cyanosis] : no extremity clubbing/cyanosis [Soft] : abdomen soft [Non Tender] : non-tender [Non-distended] : non-distended [No Masses] : no abdominal mass palpated [No HSM] : no HSM [Normal Bowel Sounds] : normal bowel sounds [Normal Posterior Cervical Nodes] : no posterior cervical lymphadenopathy [Normal Anterior Cervical Nodes] : no anterior cervical lymphadenopathy [No CVA Tenderness] : no CVA  tenderness [No Spinal Tenderness] : no spinal tenderness [No Joint Swelling] : no joint swelling [Grossly Normal Strength/Tone] : grossly normal strength/tone [No Rash] : no rash [Coordination Grossly Intact] : coordination grossly intact [No Focal Deficits] : no focal deficits [Normal Gait] : normal gait [Deep Tendon Reflexes (DTR)] : deep tendon reflexes were 2+ and symmetric [Normal Affect] : the affect was normal [Normal Insight/Judgement] : insight and judgment were intact [de-identified] : Anicteric [de-identified] : No jaundice

## 2024-08-06 NOTE — ASSESSMENT
[FreeTextEntry1] : Patient's status post acute hepatitis A with resolving LFTs.  Last bilirubin was 3 LFTs low 100s.  Will repeat labs today patient anticipates returning to work in September. In addition patient also had COVID in the last 2 weeks recovered without the need for medication as Paxlovid was significant contraindication. Patient will follow-up if any further symptoms otherwise will have routine visit in 6 months.  All issues regarding patient's health and medical problems have been discussed. The patient understands and concurs with the treatment plan.

## 2024-08-06 NOTE — HISTORY OF PRESENT ILLNESS
[FreeTextEntry1] : Patient here for evaluation of their medical problems and new issues to be discussed. [de-identified] : Abnl LFTS last vist 4 days later fever advil ER 07/01 GSH signif LFT abnl >2000 Prodrome of fever and fatigue - Dark Urine and jaundiced - went to hosp x 07-07/05 LFTS high -Hep A ? source  Patient still has low-grade fever.  Has not traveled food has not eaten anywhere new aware of other people with.  Her sister who is caring for received vaccines and 2 children were vaccinated by pediatrician Hospital records reviewed showed CAT scan with active gallbladder only hepatitis A IgM strongly positive liver profile peaked at over 3000 INR was elevated 1.7.  Patient is unchanged still with dark urine and light-colored stools no neurological abnormalities no confusion  [id number] Patient here in follow-up.  Recent liver function test showed significant decrease of LFTs but increase in bilirubin to 10.  She now comes in feeling better urine less discolored stool darkening low-grade fever persists otherwise asymptomatic  [id number] covid resolving hepatitis A  08/06/2024 Patient doing weather although still quite fatigued.  Last liver profile almost back to normal.  Subsequent COVID infection not treated with Paxlovid due to hepatotoxicity.  She is now more than 2 weeks out and is asymptomatic.  Only complaints of fatigue

## 2024-08-19 ENCOUNTER — TRANSCRIPTION ENCOUNTER (OUTPATIENT)
Age: 54
End: 2024-08-19

## 2024-08-26 ENCOUNTER — TRANSCRIPTION ENCOUNTER (OUTPATIENT)
Age: 54
End: 2024-08-26

## 2024-08-27 ENCOUNTER — APPOINTMENT (OUTPATIENT)
Dept: INTERNAL MEDICINE | Facility: CLINIC | Age: 54
End: 2024-08-27
Payer: COMMERCIAL

## 2024-08-27 VITALS
DIASTOLIC BLOOD PRESSURE: 55 MMHG | BODY MASS INDEX: 20.4 KG/M2 | HEIGHT: 67 IN | WEIGHT: 130 LBS | SYSTOLIC BLOOD PRESSURE: 90 MMHG

## 2024-08-27 DIAGNOSIS — B15.9 HEPATITIS A W/OUT HEPATIC COMA: ICD-10-CM

## 2024-08-27 DIAGNOSIS — K75.9 INFLAMMATORY LIVER DISEASE, UNSPECIFIED: ICD-10-CM

## 2024-08-27 DIAGNOSIS — R53.83 OTHER FATIGUE: ICD-10-CM

## 2024-08-27 DIAGNOSIS — R79.9 ABNORMAL FINDING OF BLOOD CHEMISTRY, UNSPECIFIED: ICD-10-CM

## 2024-08-27 DIAGNOSIS — B94.9 OTHER FATIGUE: ICD-10-CM

## 2024-08-27 PROCEDURE — 99214 OFFICE O/P EST MOD 30 MIN: CPT

## 2024-08-27 NOTE — ASSESSMENT
[FreeTextEntry1] : Patient with postinfectious fatigue with acute hepatitis A followed by COVID.  I do not think she has long COVID I just think it is the process of 2 significant illnesses 1 after the other that is causing postviral fatigue.  Agree with patient that she should not return to work at the current time.  Will repeat labs to make sure there is no exacerbation but unlikely.  Insert Summary

## 2024-08-27 NOTE — HISTORY OF PRESENT ILLNESS
[FreeTextEntry1] : Patient here for evaluation of their medical problems and new issues to be discussed. [de-identified] : Abnl LFTS last vist 4 days later fever advil ER 07/01 GSH signif LFT abnl >2000 Prodrome of fever and fatigue - Dark Urine and jaundiced - went to hosp x 07-07/05 LFTS high -Hep A ? source  Patient still has low-grade fever.  Has not traveled food has not eaten anywhere new aware of other people with.  Her sister who is caring for received vaccines and 2 children were vaccinated by pediatrician Hospital records reviewed showed CAT scan with active gallbladder only hepatitis A IgM strongly positive liver profile peaked at over 3000 INR was elevated 1.7.  Patient is unchanged still with dark urine and light-colored stools no neurological abnormalities no confusion  [id number] Patient here in follow-up.  Recent liver function test showed significant decrease of LFTs but increase in bilirubin to 10.  She now comes in feeling better urine less discolored stool darkening low-grade fever persists otherwise asymptomatic  [id number] covid resolving hepatitis A  08/06/2024 Patient doing weather although still quite fatigued.  Last liver profile almost back to normal.  Subsequent COVID infection not treated with Paxlovid due to hepatotoxicity.  She is now more than 2 weeks out and is asymptomatic.  Only complaints of fatigue  [id number] Patient returns due to continued fatigue.  She has not noted any increase discoloration of her urine or scleral icterus.  No fever and chills.  No significant sleep disturbance just overall fatigue since hep A followed by COVID

## 2024-08-27 NOTE — PHYSICAL EXAM
[No Acute Distress] : no acute distress [Well Nourished] : well nourished [Well Developed] : well developed [Well-Appearing] : well-appearing [Normal Sclera/Conjunctiva] : normal sclera/conjunctiva [PERRL] : pupils equal round and reactive to light [EOMI] : extraocular movements intact [Normal Outer Ear/Nose] : the outer ears and nose were normal in appearance [Normal Oropharynx] : the oropharynx was normal [No JVD] : no jugular venous distention [No Lymphadenopathy] : no lymphadenopathy [Supple] : supple [Thyroid Normal, No Nodules] : the thyroid was normal and there were no nodules present [No Respiratory Distress] : no respiratory distress  [No Accessory Muscle Use] : no accessory muscle use [Clear to Auscultation] : lungs were clear to auscultation bilaterally [Normal Rate] : normal rate  [Regular Rhythm] : with a regular rhythm [Normal S1, S2] : normal S1 and S2 [No Murmur] : no murmur heard [No Carotid Bruits] : no carotid bruits [No Abdominal Bruit] : a ~M bruit was not heard ~T in the abdomen [No Varicosities] : no varicosities [Pedal Pulses Present] : the pedal pulses are present [No Edema] : there was no peripheral edema [No Palpable Aorta] : no palpable aorta [No Extremity Clubbing/Cyanosis] : no extremity clubbing/cyanosis [Soft] : abdomen soft [Non Tender] : non-tender [Non-distended] : non-distended [No Masses] : no abdominal mass palpated [No HSM] : no HSM [Normal Bowel Sounds] : normal bowel sounds [Normal Posterior Cervical Nodes] : no posterior cervical lymphadenopathy [Normal Anterior Cervical Nodes] : no anterior cervical lymphadenopathy [No CVA Tenderness] : no CVA  tenderness [No Spinal Tenderness] : no spinal tenderness [No Joint Swelling] : no joint swelling [Grossly Normal Strength/Tone] : grossly normal strength/tone [No Rash] : no rash [Coordination Grossly Intact] : coordination grossly intact [No Focal Deficits] : no focal deficits [Normal Gait] : normal gait [Deep Tendon Reflexes (DTR)] : deep tendon reflexes were 2+ and symmetric [Normal Affect] : the affect was normal [Normal Insight/Judgement] : insight and judgment were intact [de-identified] : Anicteric [de-identified] : Normal evaluation

## 2024-09-02 ENCOUNTER — NON-APPOINTMENT (OUTPATIENT)
Age: 54
End: 2024-09-02

## 2024-09-03 ENCOUNTER — TRANSCRIPTION ENCOUNTER (OUTPATIENT)
Age: 54
End: 2024-09-03

## 2024-09-03 ENCOUNTER — NON-APPOINTMENT (OUTPATIENT)
Age: 54
End: 2024-09-03

## 2024-09-12 ENCOUNTER — NON-APPOINTMENT (OUTPATIENT)
Age: 54
End: 2024-09-12

## 2024-09-17 ENCOUNTER — TRANSCRIPTION ENCOUNTER (OUTPATIENT)
Age: 54
End: 2024-09-17

## 2024-09-27 ENCOUNTER — TRANSCRIPTION ENCOUNTER (OUTPATIENT)
Age: 54
End: 2024-09-27

## 2024-10-01 ENCOUNTER — TRANSCRIPTION ENCOUNTER (OUTPATIENT)
Age: 54
End: 2024-10-01

## 2024-11-14 ENCOUNTER — APPOINTMENT (OUTPATIENT)
Dept: OBGYN | Facility: CLINIC | Age: 54
End: 2024-11-14

## 2025-06-05 ENCOUNTER — NON-APPOINTMENT (OUTPATIENT)
Age: 55
End: 2025-06-05

## 2025-06-23 ENCOUNTER — RX RENEWAL (OUTPATIENT)
Age: 55
End: 2025-06-23

## 2025-08-14 ENCOUNTER — NON-APPOINTMENT (OUTPATIENT)
Age: 55
End: 2025-08-14

## 2025-08-18 ENCOUNTER — TRANSCRIPTION ENCOUNTER (OUTPATIENT)
Age: 55
End: 2025-08-18

## 2025-08-26 ENCOUNTER — NON-APPOINTMENT (OUTPATIENT)
Age: 55
End: 2025-08-26

## 2025-08-26 ENCOUNTER — APPOINTMENT (OUTPATIENT)
Dept: INTERNAL MEDICINE | Facility: CLINIC | Age: 55
End: 2025-08-26
Payer: COMMERCIAL

## 2025-08-26 VITALS
SYSTOLIC BLOOD PRESSURE: 104 MMHG | DIASTOLIC BLOOD PRESSURE: 74 MMHG | WEIGHT: 143 LBS | BODY MASS INDEX: 22.4 KG/M2 | HEART RATE: 88 BPM

## 2025-08-26 DIAGNOSIS — Z83.3 FAMILY HISTORY OF DIABETES MELLITUS: ICD-10-CM

## 2025-08-26 DIAGNOSIS — E03.9 HYPOTHYROIDISM, UNSPECIFIED: ICD-10-CM

## 2025-08-26 DIAGNOSIS — Z82.0 FAMILY HISTORY OF EPILEPSY AND OTHER DISEASES OF THE NERVOUS SYSTEM: ICD-10-CM

## 2025-08-26 DIAGNOSIS — R43.1 PAROSMIA: ICD-10-CM

## 2025-08-26 DIAGNOSIS — Z00.00 ENCOUNTER FOR GENERAL ADULT MEDICAL EXAMINATION W/OUT ABNORMAL FINDINGS: ICD-10-CM

## 2025-08-26 DIAGNOSIS — R23.8 OTHER SKIN CHANGES: ICD-10-CM

## 2025-08-26 PROCEDURE — 99386 PREV VISIT NEW AGE 40-64: CPT

## 2025-09-15 ENCOUNTER — APPOINTMENT (OUTPATIENT)
Dept: OTOLARYNGOLOGY | Facility: CLINIC | Age: 55
End: 2025-09-15
Payer: COMMERCIAL

## 2025-09-15 VITALS
DIASTOLIC BLOOD PRESSURE: 77 MMHG | BODY MASS INDEX: 22.44 KG/M2 | SYSTOLIC BLOOD PRESSURE: 114 MMHG | HEART RATE: 80 BPM | HEIGHT: 67 IN | WEIGHT: 143 LBS

## 2025-09-15 DIAGNOSIS — R43.1 PAROSMIA: ICD-10-CM

## 2025-09-15 PROCEDURE — 99212 OFFICE O/P EST SF 10 MIN: CPT

## 2025-09-16 ENCOUNTER — RX RENEWAL (OUTPATIENT)
Age: 55
End: 2025-09-16